# Patient Record
Sex: FEMALE | Race: WHITE | Employment: PART TIME | URBAN - METROPOLITAN AREA
[De-identification: names, ages, dates, MRNs, and addresses within clinical notes are randomized per-mention and may not be internally consistent; named-entity substitution may affect disease eponyms.]

---

## 2022-03-02 ENCOUNTER — APPOINTMENT (OUTPATIENT)
Dept: CT IMAGING | Facility: CLINIC | Age: 22
End: 2022-03-02
Attending: EMERGENCY MEDICINE
Payer: COMMERCIAL

## 2022-03-02 ENCOUNTER — HOSPITAL ENCOUNTER (EMERGENCY)
Facility: CLINIC | Age: 22
Discharge: HOME | End: 2022-03-03
Attending: EMERGENCY MEDICINE | Admitting: EMERGENCY MEDICINE
Payer: COMMERCIAL

## 2022-03-02 DIAGNOSIS — E87.6 HYPOKALEMIA: ICD-10-CM

## 2022-03-02 DIAGNOSIS — R94.31 LONG QT INTERVAL: ICD-10-CM

## 2022-03-02 DIAGNOSIS — R55 SYNCOPE, UNSPECIFIED SYNCOPE TYPE: ICD-10-CM

## 2022-03-02 DIAGNOSIS — S06.0X9A CONCUSSION WITH LOSS OF CONSCIOUSNESS, INITIAL ENCOUNTER: ICD-10-CM

## 2022-03-02 LAB
ANION GAP SERPL CALCULATED.3IONS-SCNC: 4 MMOL/L (ref 3–14)
B-HCG FREE SERPL-ACNC: <5 IU/L (ref 0–5)
BASOPHILS # BLD AUTO: 0 10E3/UL (ref 0–0.2)
BASOPHILS NFR BLD AUTO: 1 %
BUN SERPL-MCNC: 8 MG/DL (ref 7–30)
CALCIUM SERPL-MCNC: 9.6 MG/DL (ref 8.5–10.1)
CHLORIDE BLD-SCNC: 106 MMOL/L (ref 94–109)
CO2 SERPL-SCNC: 28 MMOL/L (ref 20–32)
CREAT SERPL-MCNC: 0.62 MG/DL (ref 0.52–1.04)
EOSINOPHIL # BLD AUTO: 0.1 10E3/UL (ref 0–0.7)
EOSINOPHIL NFR BLD AUTO: 1 %
ERYTHROCYTE [DISTWIDTH] IN BLOOD BY AUTOMATED COUNT: 13 % (ref 10–15)
GFR SERPL CREATININE-BSD FRML MDRD: >90 ML/MIN/1.73M2
GLUCOSE BLD-MCNC: 99 MG/DL (ref 70–99)
HCT VFR BLD AUTO: 40.6 % (ref 35–47)
HGB BLD-MCNC: 13.4 G/DL (ref 11.7–15.7)
IMM GRANULOCYTES # BLD: 0 10E3/UL
IMM GRANULOCYTES NFR BLD: 0 %
LYMPHOCYTES # BLD AUTO: 2 10E3/UL (ref 0.8–5.3)
LYMPHOCYTES NFR BLD AUTO: 29 %
MAGNESIUM SERPL-MCNC: 2.1 MG/DL (ref 1.6–2.3)
MCH RBC QN AUTO: 29 PG (ref 26.5–33)
MCHC RBC AUTO-ENTMCNC: 33 G/DL (ref 31.5–36.5)
MCV RBC AUTO: 88 FL (ref 78–100)
MONOCYTES # BLD AUTO: 0.4 10E3/UL (ref 0–1.3)
MONOCYTES NFR BLD AUTO: 5 %
NEUTROPHILS # BLD AUTO: 4.5 10E3/UL (ref 1.6–8.3)
NEUTROPHILS NFR BLD AUTO: 64 %
NRBC # BLD AUTO: 0 10E3/UL
NRBC BLD AUTO-RTO: 0 /100
PLATELET # BLD AUTO: 219 10E3/UL (ref 150–450)
POTASSIUM BLD-SCNC: 3.3 MMOL/L (ref 3.4–5.3)
RBC # BLD AUTO: 4.62 10E6/UL (ref 3.8–5.2)
SODIUM SERPL-SCNC: 138 MMOL/L (ref 133–144)
WBC # BLD AUTO: 6.9 10E3/UL (ref 4–11)

## 2022-03-02 PROCEDURE — 96365 THER/PROPH/DIAG IV INF INIT: CPT

## 2022-03-02 PROCEDURE — 36415 COLL VENOUS BLD VENIPUNCTURE: CPT | Performed by: EMERGENCY MEDICINE

## 2022-03-02 PROCEDURE — 96375 TX/PRO/DX INJ NEW DRUG ADDON: CPT

## 2022-03-02 PROCEDURE — 99285 EMERGENCY DEPT VISIT HI MDM: CPT | Mod: 25

## 2022-03-02 PROCEDURE — 70450 CT HEAD/BRAIN W/O DYE: CPT

## 2022-03-02 PROCEDURE — 258N000003 HC RX IP 258 OP 636: Performed by: EMERGENCY MEDICINE

## 2022-03-02 PROCEDURE — 96361 HYDRATE IV INFUSION ADD-ON: CPT

## 2022-03-02 PROCEDURE — 85014 HEMATOCRIT: CPT | Performed by: EMERGENCY MEDICINE

## 2022-03-02 PROCEDURE — 83735 ASSAY OF MAGNESIUM: CPT | Performed by: EMERGENCY MEDICINE

## 2022-03-02 PROCEDURE — 80048 BASIC METABOLIC PNL TOTAL CA: CPT | Performed by: EMERGENCY MEDICINE

## 2022-03-02 PROCEDURE — 84702 CHORIONIC GONADOTROPIN TEST: CPT

## 2022-03-02 PROCEDURE — 250N000013 HC RX MED GY IP 250 OP 250 PS 637: Performed by: EMERGENCY MEDICINE

## 2022-03-02 PROCEDURE — 250N000011 HC RX IP 250 OP 636: Performed by: EMERGENCY MEDICINE

## 2022-03-02 PROCEDURE — 93005 ELECTROCARDIOGRAM TRACING: CPT

## 2022-03-02 RX ORDER — DIPHENHYDRAMINE HYDROCHLORIDE 50 MG/ML
25 INJECTION INTRAMUSCULAR; INTRAVENOUS ONCE
Status: COMPLETED | OUTPATIENT
Start: 2022-03-02 | End: 2022-03-02

## 2022-03-02 RX ORDER — BUTALBITAL, ACETAMINOPHEN AND CAFFEINE 50; 325; 40 MG/1; MG/1; MG/1
1 TABLET ORAL EVERY 6 HOURS PRN
Qty: 10 TABLET | Refills: 0 | Status: SHIPPED | OUTPATIENT
Start: 2022-03-02 | End: 2022-03-02

## 2022-03-02 RX ORDER — BUTALBITAL, ACETAMINOPHEN AND CAFFEINE 50; 325; 40 MG/1; MG/1; MG/1
1 TABLET ORAL EVERY 6 HOURS PRN
Qty: 10 TABLET | Refills: 0 | Status: SHIPPED | OUTPATIENT
Start: 2022-03-02

## 2022-03-02 RX ORDER — POTASSIUM CHLORIDE 1500 MG/1
20 TABLET, EXTENDED RELEASE ORAL ONCE
Status: COMPLETED | OUTPATIENT
Start: 2022-03-02 | End: 2022-03-02

## 2022-03-02 RX ORDER — METOCLOPRAMIDE HYDROCHLORIDE 5 MG/ML
10 INJECTION INTRAMUSCULAR; INTRAVENOUS ONCE
Status: COMPLETED | OUTPATIENT
Start: 2022-03-02 | End: 2022-03-02

## 2022-03-02 RX ORDER — MAGNESIUM SULFATE HEPTAHYDRATE 40 MG/ML
2 INJECTION, SOLUTION INTRAVENOUS ONCE
Status: COMPLETED | OUTPATIENT
Start: 2022-03-02 | End: 2022-03-03

## 2022-03-02 RX ADMIN — DIPHENHYDRAMINE HYDROCHLORIDE 25 MG: 50 INJECTION INTRAMUSCULAR; INTRAVENOUS at 21:57

## 2022-03-02 RX ADMIN — MAGNESIUM SULFATE HEPTAHYDRATE 2 G: 2 INJECTION, SOLUTION INTRAVENOUS at 23:06

## 2022-03-02 RX ADMIN — POTASSIUM CHLORIDE 20 MEQ: 1500 TABLET, EXTENDED RELEASE ORAL at 23:07

## 2022-03-02 RX ADMIN — SODIUM CHLORIDE 1000 ML: 9 INJECTION, SOLUTION INTRAVENOUS at 21:57

## 2022-03-02 RX ADMIN — METOCLOPRAMIDE HYDROCHLORIDE 10 MG: 5 INJECTION INTRAMUSCULAR; INTRAVENOUS at 21:57

## 2022-03-02 ASSESSMENT — ENCOUNTER SYMPTOMS
SHORTNESS OF BREATH: 0
WEAKNESS: 0
NUMBNESS: 0
FEVER: 0
HEADACHES: 1
NAUSEA: 0

## 2022-03-02 NOTE — Clinical Note
Liz Israel was seen and treated in our emergency department on 3/2/2022.  She may return to work on 03/04/2022.       If you have any questions or concerns, please don't hesitate to call.      Gutierrez Garcia MD

## 2022-03-03 VITALS
OXYGEN SATURATION: 98 % | RESPIRATION RATE: 20 BRPM | DIASTOLIC BLOOD PRESSURE: 73 MMHG | HEART RATE: 70 BPM | SYSTOLIC BLOOD PRESSURE: 122 MMHG | WEIGHT: 117.5 LBS | TEMPERATURE: 97.9 F

## 2022-03-03 LAB
ATRIAL RATE - MUSE: 106 BPM
ATRIAL RATE - MUSE: 73 BPM
DIASTOLIC BLOOD PRESSURE - MUSE: NORMAL MMHG
DIASTOLIC BLOOD PRESSURE - MUSE: NORMAL MMHG
INTERPRETATION ECG - MUSE: NORMAL
INTERPRETATION ECG - MUSE: NORMAL
P AXIS - MUSE: 77 DEGREES
P AXIS - MUSE: 80 DEGREES
PR INTERVAL - MUSE: 144 MS
PR INTERVAL - MUSE: 152 MS
QRS DURATION - MUSE: 76 MS
QRS DURATION - MUSE: 78 MS
QT - MUSE: 378 MS
QT - MUSE: 402 MS
QTC - MUSE: 442 MS
QTC - MUSE: 502 MS
R AXIS - MUSE: 93 DEGREES
R AXIS - MUSE: 96 DEGREES
SYSTOLIC BLOOD PRESSURE - MUSE: NORMAL MMHG
SYSTOLIC BLOOD PRESSURE - MUSE: NORMAL MMHG
T AXIS - MUSE: 11 DEGREES
T AXIS - MUSE: 29 DEGREES
VENTRICULAR RATE- MUSE: 106 BPM
VENTRICULAR RATE- MUSE: 73 BPM

## 2022-03-03 NOTE — ED TRIAGE NOTES
Pt arrives to the ED due to a frontal headache that has been present since last Saturday. Denies any h/o of headaches. Pt states feeling dizzy/lightheaded and nauseous. Pt states that on last Sat, she had a feeling where she was going to black out.

## 2022-03-03 NOTE — DISCHARGE INSTRUCTIONS
The CT scan of your head is normal with no bleeding or broken bones.  Your evaluation is consistent with concussion.  Do not engage in any contact activities until cleared by your primary care doctor.    In regards to your episode in which you passed out, I believe this most likely represents a process called vasovagal syncope.  On your EKG, there is an interval that is mildly prolonged called the QT interval.  I would like you to follow-up with cardiology regarding this finding.  Please return to the ER immediately for any additional episodes of passing out or syncope.    Discharge Instructions  Concussion    You were seen today for signs of a concussion.  The symptoms will vary, depending on the nature of your injury and your health. You may have: headache, confusion, nausea (feel sick to your stomach), vomiting (throwing up) and problems with memory, concentrating, or sleep. You may feel dizzy, irritable, and tired. Children and teens may need help from their parents, teachers, and coaches to watch for symptoms as they recover.    Generally, every Emergency Department visit should have a follow-up clinic visit with either a primary or a specialty clinic/provider. Please follow-up as instructed by your emergency provider today.     Return to the Emergency Department if:  Your headache gets worse or you start to have a really bad headache even with the recommended treatment plan.   You feel drowsier, have growing confusion, or slurred speech.   You keep repeating yourself.   You have strange behavior or are feeling more irritable.   You have a seizure.   You vomit (throw up) more than once.   You have trouble walking.   You have weakness or numbness.  Your neck pain gets worse.   You have a loss of consciousness.   You have blood for fluid coming from your ears or nose.   You have new symptoms or anything that worries you.     Home Care:  Get lots of rest and get enough sleep at night. Take daytime naps or rest if  you feel tired.   Limit physical activity and  thinking  activities. These can make symptoms worse.   Physical activities include gym, sports, weight training, running, exercise, and heavy lifting.   Thinking activities include homework, class work, job-related work, and screen time (phone, computer, tablet, TV, and video games).   Stick to a healthy diet and drink lots of fluids. Avoid alcohol.  As symptoms improve, you may slowly return to your daily activities. If symptoms get worse or return, reduce your activity.   Know that it is normal to feel sad or frustrated when you do not feel right and are less active.     Going Back to Work:  Your care team will tell you when you are ready to return to work.    Limit the amount of work you do soon after your injury. This may speed healing. Take breaks if your symptoms get worse. You should also reduce your physical activity as well as activities that require a lot of thinking until you see your doctor. You may need shorter work days and a lighter workload.  Avoid heavy lifting, working with machinery, driving and working at heights until your symptoms are gone or you are cleared by a provider.    Going Back to School:  If you are still having symptoms, you may need extra help at school.  Tell your teachers and school nurse about your injury and symptoms. Ask them to watch for problems with learning, memory, and concentrating. Symptoms may get worse when you do schoolwork, and you may become more irritable. You may need shorter school days, a reduced workload, and to postpone testing.  Do not drive or take gym class (physical activity) until cleared by a provider.    Returning to Sports:  Never return to play if you have any symptoms. A full recovery will reduce the chances of getting hurt again. Remember, it is better to miss one or two games than a whole season.  You should rest from all physical activity until you see your provider. Generally, if all symptoms have  completely cleared, your provider can help guide you to slowly return to sports. If symptoms return or worsen, stop the activity and see your provider.  Important: If you are in an organized sport and under age 18, you will need written consent from a healthcare provider before you return to sports. Typically, this will be your primary care or sports medicine provider. Please make an appointment.    If you were given a prescription for medicine here today, be sure to read all of the information (including the package insert) that comes with your prescription.  This will include important information about the medicine, its side effects, and any warnings that you need to know about.  The pharmacist who fills the prescription can provide more information and answer questions you may have about the medicine.  If you have questions or concerns that the pharmacist cannot address, please call or return to the Emergency Department.     Remember that you can always come back to the Emergency Department if you are not able to see your regular provider in the amount of time listed above, if you get any new symptoms, or if there is anything that worries you.  Discharge Instructions  Syncope    Syncope (fainting) is a sudden, short loss of consciousness (passing out spell). People will usually fall to the ground when they faint or slump over if seated.  People may also shake when this happens, and it can sometimes be difficult to tell the difference between syncope and a seizure. At this time, your provider does not find a reason to suspect that your fainting spell is a sign of anything dangerous or life-threatening.  However, sometimes the signs of serious illness do not show up right away.     Generally, every Emergency Department visit should have a follow-up clinic visit with either a primary or a specialty clinic/provider. Please follow-up as instructed by your emergency provider today.    Return to the Emergency  Department if:  You faint again.   You have any significant bleeding.  You have chest pain or a fast or irregular heartbeat.  You feel short of breath.  You cough up any blood.  You have abdominal (belly) pain or unusual back pain.  You have ongoing vomiting (throwing up) or diarrhea (loose stools).  You have a black or tarry bowel movement, or blood in the stool or in your vomit.  You have a fever over 101 F.  You lose feeling or cannot move a part of your body or cannot talk normally.  You are confused, have a headache, cannot see well, or have a seizure.  DO NOT DRIVE. CALL 911 INSTEAD!    What can I do to help myself?  Follow any specific instructions that your provider discussed with you.  If you feel light-headed, make sure to sit down right away, even if you have to sit on the floor.  Follow up with your regular medical provider as discussed for further management. This may include lowering your blood pressure medications, insulin or other diabetic medications, checking your blood sugar more frequently, and drinking more fluids, taking medicines for vomiting or diarrhea or getting up slower.  If you were given a prescription for medicine here today, be sure to read all of the information (including the package insert) that comes with your prescription.  This will include important information about the medicine, its side effects, and any warnings that you need to know about.  The pharmacist who fills the prescription can provide more information and answer questions you may have about the medicine.  If you have questions or concerns that the pharmacist cannot address, please call or return to the Emergency Department.   Remember that you can always come back to the Emergency Department if you are not able to see your regular provider in the amount of time listed above, if you get any new symptoms, or if there is anything that worries you.

## 2022-03-03 NOTE — ED PROVIDER NOTES
History   Chief Complaint:  Headache       The history is provided by a parent and the patient. A  was used (ASL).      Liz Israel is a 21 year old female, otherwise healthy, who presents with headache. Four days ago, the patient had woken up feeling okay but then went to the bathroom and was suddenly dizzy prior to having a syncopal episode. The prodrome of dizziness lasted 30-60 seconds. When she woke up, she saw her mother over her and she was rather confused how she was on the floor. Her mother noted she was rather pale when she came to. She also noted that her head and arm hurt a little bit. Once she was back in to bed, she began to feel a little better. She has never had a syncopal episode before and she attributed it to maybe not eating enough that day. Later that afternoon, she developed a headache in her forehead. Since then, her headache has been ongoing and she has been taking ibuprofen at home which offers a little relief. Additionally, she notes having some hot/cold flashes that come and go. Today, she was seen at Urgent Care but they did did not do much of a workup at that time. She denies any recent fever, shortness of breath, chest pain, nausea, weakness, or numbness.       Review of Systems   Constitutional: Negative for fever.   Respiratory: Negative for shortness of breath.    Cardiovascular: Negative for chest pain.   Gastrointestinal: Negative for nausea.   Neurological: Positive for headaches. Negative for weakness and numbness.     Allergies:  The patient has no known allergies.     Medications:  Birth control pill per patient's mother    Past Medical History:     Deaf    Past Surgical History:    The patient has no known surgical history.    Family History:    The patient has no known family history.    Social History:  The patient presents to the ED with her mother.      Physical Exam     Patient Vitals for the past 24 hrs:   BP Temp Temp src Pulse Resp SpO2  Weight   03/02/22 2052 119/72 97.9  F (36.6  C) Oral 71 20 100 % 53.3 kg (117 lb 8.1 oz)       Physical Exam    HEENT:   No scalp hematoma or defect to the bony calvarium.      Allison's and Racoon's sign negative.      Oropharynx is moist  EYES:  Conjunctiva normal, PERRL    EOMs intact  NECK:   C-spine non-tender.      No bony step-off to cervical spine.   CV:    Regular rate and rhythm.     No murmurs, rubs or gallops.    PULM:  Clear to auscultation bilateral.      No respiratory distress.    ABD:   Soft, non-tender, non-distended.      No rebound or guarding.  MSK:    No gross deformity to the extremities.    LYMPH:  No cervical lymphadenopathy.  NEURO:  Alert and oriented x 3. GCS 15.      CN II-XII intact    Finger to nose normal bilateral     Strength is 5/5 in all 4 extremities.  Sensation is intact.      Normal muscular tone, no tremor.  SKIN:   Warm, dry and intact.    PSYCH:   Mood is good and affect is appropriate.      Emergency Department Course   ECG  ECG obtained at 2206, ECG read at 2210  Sinus tachycardia. Rightward axis. Nonspecific T wave abnormality. Prolonged QT interval.    Rate 106 bpm. WV interval 144 ms. QRS duration 76 ms. QT/QTc 378/502 ms. P-R-T axes 80 96 11.     Imaging:  Head CT w/o contrast    (Results Pending)     Report per radiology    Laboratory:  Labs Ordered and Resulted from Time of ED Arrival to Time of ED Departure   BASIC METABOLIC PANEL - Abnormal       Result Value    Sodium 138      Potassium 3.3 (*)     Chloride 106      Carbon Dioxide (CO2) 28      Anion Gap 4      Urea Nitrogen 8      Creatinine 0.62      Calcium 9.6      Glucose 99      GFR Estimate >90     ISTAT HCG QUANTITATIVE PREGNANCY POCT - Normal    HCG QUANTITATIVE POCT <5.0     CBC WITH PLATELETS AND DIFFERENTIAL    WBC Count 6.9      RBC Count 4.62      Hemoglobin 13.4      Hematocrit 40.6      MCV 88      MCH 29.0      MCHC 33.0      RDW 13.0      Platelet Count 219      % Neutrophils 64      % Lymphocytes  29      % Monocytes 5      % Eosinophils 1      % Basophils 1      % Immature Granulocytes 0      NRBCs per 100 WBC 0      Absolute Neutrophils 4.5      Absolute Lymphocytes 2.0      Absolute Monocytes 0.4      Absolute Eosinophils 0.1      Absolute Basophils 0.0      Absolute Immature Granulocytes 0.0      Absolute NRBCs 0.0     MAGNESIUM        Emergency Department Course:             Reviewed:  I reviewed nursing notes, vitals, past medical history, Care Everywhere and [facility]    Assessments:   I obtained history and examined the patient as noted above.    I rechecked the patient and explained findings.     Interventions:   NS 1L IV Bolus   Benadryl 25 mg IV   Reglan 10 mg IV    Disposition:  The patient was discharged to home.     Impression & Plan     CMS Diagnoses: None    Medical Decision Makin-year-old female seen in the ED with a syncopal episode 4 days prior to arrival resulting in closed head injury and persistent headache.  Investigation into her syncope reveals no EKG evidence of WPW, Brugada or hypertrophic cardiomyopathy.  Her QTc is mildly prolonged at 500.  There is mild hypokalemia thus patient given oral potassium and IV magnesium.  EKG will repeated after magnesium to evaluate for improvement of QTc.  Low suspicion that her syncope is related to prolonged QT interval inducing malignant dysrhythmia and is more suggestive of vasovagal syncope.  Her laboratory studies are reassuring with no acute anemia.  She has no chest pain or shortness of breath to suggest aortic dissection, aortic aneurysm or pulmonary embolism.  I believe patient is safe for discharge home, close follow-up with primary care physician and will be referred to cardiology given the episode of syncope and prolonged QT interval on EKG.    In regards to her headache, she did strike her head during the syncopal episode.  CT scan of her head undertaken and unremarkable.  Evaluation is consistent with  concussion.  Patient was given appropriate anticipatory guidance.  Supportive measures indicated and close follow-up with PCP.    Diagnosis:    ICD-10-CM    1. Syncope, unspecified syncope type  R55 Follow-Up with Cardiology   2. Concussion with loss of consciousness, initial encounter  S06.0X9A    3. Long QT interval  R94.31 Follow-Up with Cardiology   4. Hypokalemia  E87.6        Discharge Medications:  New Prescriptions    BUTALBITAL-ACETAMINOPHEN-CAFFEINE (ESGIC) -40 MG TABLET    Take 1 tablet by mouth every 6 hours as needed for headaches       Scribe Disclosure:  I, Kristyn Briceno, am serving as a scribe at 9:02 PM on 3/2/2022 to document services personally performed by Gutierrez Garcia MD based on my observations and the provider's statements to me.              Gutierrez Garcia MD  03/02/22 [id number]

## 2022-03-11 ENCOUNTER — OFFICE VISIT (OUTPATIENT)
Dept: CARDIOLOGY | Facility: CLINIC | Age: 22
End: 2022-03-11
Attending: EMERGENCY MEDICINE
Payer: COMMERCIAL

## 2022-03-11 VITALS
SYSTOLIC BLOOD PRESSURE: 122 MMHG | DIASTOLIC BLOOD PRESSURE: 72 MMHG | HEIGHT: 65 IN | WEIGHT: 117.5 LBS | BODY MASS INDEX: 19.58 KG/M2 | HEART RATE: 71 BPM | OXYGEN SATURATION: 100 %

## 2022-03-11 DIAGNOSIS — R94.31 LONG QT INTERVAL: ICD-10-CM

## 2022-03-11 DIAGNOSIS — R55 SYNCOPE, UNSPECIFIED SYNCOPE TYPE: ICD-10-CM

## 2022-03-11 PROCEDURE — G0463 HOSPITAL OUTPT CLINIC VISIT: HCPCS

## 2022-03-11 PROCEDURE — 99204 OFFICE O/P NEW MOD 45 MIN: CPT | Performed by: INTERNAL MEDICINE

## 2022-03-11 ASSESSMENT — PATIENT HEALTH QUESTIONNAIRE - PHQ9: SUM OF ALL RESPONSES TO PHQ QUESTIONS 1-9: 10

## 2022-03-11 ASSESSMENT — PAIN SCALES - GENERAL: PAINLEVEL: NO PAIN (0)

## 2022-03-11 NOTE — NURSING NOTE
Chief Complaint   Patient presents with     New Patient     gen cardio consult      Vitals were taken and medications were reconciled.    Loyd Crowell, EMT  2:15 PM

## 2022-03-11 NOTE — LETTER
3/11/2022      RE: Liz Israel  [address]  [place] [facility] [address]       Dear Colleague,    Thank you for the opportunity to participate in the care of your patient, Liz Israel, at the [facility] [place] [facility]. Please see a copy of my visit note below.    Cardiology Clinic Note  March 11, 2022    HPI    Dear colleagues,     I had the pleasure of seeing Ms. Liz Israel in the Cardiology clinic.  I am seeing Liz as ER follow-up visit for syncope.    Liz presents with her mother.  She is deaf and thus utilized in the .  She says that on February 26 she was in her normal state of health and felt well and woke up that morning to help her mother in the kitchen.  Upon getting up and walking around the house she felt suddenly dizzy and lightheaded and fell in the bathroom.  She felt as if she was going to fall down thus she braced herself against the wall and hit her elbow.  Her mother noticed and came to her quickly.  There is no loss of consciousness.  She was able to get up and felt okay afterwards and it thought it was possibly due to relative dehydration or not eating yet.  She continued feeling like this was some lightheadedness and dizziness for the next few days but without any further falls.  They went to the emergency room 4 days later after the event.  She had also noted a headache that has been more problems were last few days and been taking ibuprofen with little relief.  In the emergency department she was found to have normal vitals, relatively normal blood work although a low potassium of 3.3, a normal CT had, and EKGs that otherwise showed normal sinus rhythm although one of them did show a prolonged QT interval of 500 ms.  The prolonged QT was thought to be due to the low potassium and low magnesium and thus she was supplemented and the subsequent EKG showed a  "QT of 440 ms.  She was discharged from the ER with supportive care and close follow-up with her primary care physician.    Patient states she otherwise feels okay returning back to normal.  She corroborates much of the story above.  She says she was in her normal state of health.  She does not have any    PAST MEDICAL HISTORY:  None notable.  No known heart or vascular disease.    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  No illicit substance use    ALLERGIES:  No Known Allergies    CURRENT MEDICATIONS:  Current Outpatient Medications   Medication Sig Dispense Refill     butalbital-acetaminophen-caffeine (ESGIC) -40 MG tablet Take 1 tablet by mouth every 6 hours as needed for headaches 10 tablet 0     hydrOXYzine (ATARAX) 25 MG tablet Take 1 tablet (25 mg) by mouth every 6 hours as needed for anxiety (Patient not taking: Reported on 3/11/2022) 60 tablet 1       ROS:   Constitutional: No fever, chills, or sweats. Weight is 117 lbs 8 oz  ENT: No visual disturbance, ear ache, epistaxis, sore throat.   Allergies/Immunologic: Negative.   Respiratory: No cough, hemoptysis.   Cardiovascular: As per HPI.   GI: No nausea, vomiting, hematemesis, melena, or hematochezia.   : No urinary frequency, dysuria, or hematuria.   Integument: Negative.   Psychiatric: Pleasant, no major depression noted  Neuro: No focal neurological deficits noted  Endocrinology: Negative.   Musculoskeletal: No new joint pains    EXAM:  /72 (BP Location: Right arm, Patient Position: Chair, Cuff Size: Adult Small)   Pulse 71   Ht 1.651 m (5' 5\")   Wt 53.3 kg (117 lb 8 oz)   LMP 02/15/2022 (Approximate)   SpO2 100%   BMI 19.55 kg/m      General: appears comfortable, alert  Head: normocephalic, atraumatic  Eyes: anicteric sclera, EOMI  Heart: regular, S1/S2, no murmur, gallop, rub, estimated JVP 8 cm water  Lungs: clear, no rales or wheezing  Abdomen: soft  Extremities: no " clubbing, cyanosis or edema  Neurological: normal affect, no gross motor deficits    Weight  Wt Readings from Last 10 Encounters:   03/11/22 53.3 kg (117 lb 8 oz)   03/02/22 53.3 kg (117 lb 8.1 oz)       Labs:  CBC RESULTS:  Lab Results   Component Value Date    WBC 6.9 03/02/2022    RBC 4.62 03/02/2022    HGB 13.4 03/02/2022    HCT 40.6 03/02/2022    MCV 88 03/02/2022    MCH 29.0 03/02/2022    MCHC 33.0 03/02/2022    RDW 13.0 03/02/2022     03/02/2022       CMP RESULTS:  Lab Results   Component Value Date     03/02/2022    POTASSIUM 3.3 (L) 03/02/2022    CHLORIDE 106 03/02/2022    CO2 28 03/02/2022    ANIONGAP 4 03/02/2022    GLC 99 03/02/2022    BUN 8 03/02/2022    CR 0.62 03/02/2022    GFRESTIMATED >90 03/02/2022    TANYA 9.6 03/02/2022        BNP RESULTS:  No results found for: NTBNPI    Testing/Procedures:  I personally visualized and interpreted:  EKG on 3/2/2022: Sinus rhythm, 1 with a QT of 500 ms, second with a QT interval 440 ms after electrolyte supplementation    Assessment and Plan:     In summary, this is a very pleasant 21-year-old female who I am seeing as an ER follow-up for syncope.    She is considered low risk due to not having any prior cardiac history, no cardiac exam findings, no loss of consciousness.  I did discuss that an echocardiogram or Zio patch could be done but it is likely to be normal.  I discussed with her and her mother that her syncope is likely neurologically mediated or vasovagal nature and unlikely to be cardiac.  Sure she did have an initial QT that was prolonged but responded appropriately to supplementation.  Certainly if this were to happen again that we can do the above studies.  She and her mother agreed and will follow up with her primary care physician.    The patient states understanding and is agreeable with plan.   Feel free to contact myself regarding questions or concerns.  It was a pleasure to see this patient today.          Today's clinic visit  entailed:  45 minutes spent on the date of the encounter doing chart review, history and exam, documentation and further activities per the note  Provider  Link to MDM Help Grid     The level of medical decision making during this visit was of moderate complexity.            Please do not hesitate to contact me if you have any questions/concerns.     Sincerely,     Haroon Smith MD

## 2022-03-11 NOTE — PROGRESS NOTES
Cardiology Clinic Note  March 11, 2022    HPI    Dear colleagues,     I had the pleasure of seeing Ms. Liz Israel in the Cardiology clinic.  I am seeing Liz as ER follow-up visit for syncope.    Liz presents with her mother.  She is deaf and thus utilized in the .  She says that on February 26 she was in her normal state of health and felt well and woke up that morning to help her mother in the kitchen.  Upon getting up and walking around the house she felt suddenly dizzy and lightheaded and fell in the bathroom.  She felt as if she was going to fall down thus she braced herself against the wall and hit her elbow.  Her mother noticed and came to her quickly.  There is no loss of consciousness.  She was able to get up and felt okay afterwards and it thought it was possibly due to relative dehydration or not eating yet.  She continued feeling like this was some lightheadedness and dizziness for the next few days but without any further falls.  They went to the emergency room 4 days later after the event.  She had also noted a headache that has been more problems were last few days and been taking ibuprofen with little relief.  In the emergency department she was found to have normal vitals, relatively normal blood work although a low potassium of 3.3, a normal CT had, and EKGs that otherwise showed normal sinus rhythm although one of them did show a prolonged QT interval of 500 ms.  The prolonged QT was thought to be due to the low potassium and low magnesium and thus she was supplemented and the subsequent EKG showed a QT of 440 ms.  She was discharged from the ER with supportive care and close follow-up with her primary care physician.    Patient states she otherwise feels okay returning back to normal.  She corroborates much of the story above.  She says she was in her normal state of health.  She does not have any    PAST MEDICAL HISTORY:  None notable.  No  "known heart or vascular disease.    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  No illicit substance use    ALLERGIES:  No Known Allergies    CURRENT MEDICATIONS:  Current Outpatient Medications   Medication Sig Dispense Refill     butalbital-acetaminophen-caffeine (ESGIC) -40 MG tablet Take 1 tablet by mouth every 6 hours as needed for headaches 10 tablet 0     hydrOXYzine (ATARAX) 25 MG tablet Take 1 tablet (25 mg) by mouth every 6 hours as needed for anxiety (Patient not taking: Reported on 3/11/2022) 60 tablet 1       ROS:   Constitutional: No fever, chills, or sweats. Weight is 117 lbs 8 oz  ENT: No visual disturbance, ear ache, epistaxis, sore throat.   Allergies/Immunologic: Negative.   Respiratory: No cough, hemoptysis.   Cardiovascular: As per HPI.   GI: No nausea, vomiting, hematemesis, melena, or hematochezia.   : No urinary frequency, dysuria, or hematuria.   Integument: Negative.   Psychiatric: Pleasant, no major depression noted  Neuro: No focal neurological deficits noted  Endocrinology: Negative.   Musculoskeletal: No new joint pains    EXAM:  /72 (BP Location: Right arm, Patient Position: Chair, Cuff Size: Adult Small)   Pulse 71   Ht 1.651 m (5' 5\")   Wt 53.3 kg (117 lb 8 oz)   LMP 02/15/2022 (Approximate)   SpO2 100%   BMI 19.55 kg/m      General: appears comfortable, alert  Head: normocephalic, atraumatic  Eyes: anicteric sclera, EOMI  Heart: regular, S1/S2, no murmur, gallop, rub, estimated JVP 8 cm water  Lungs: clear, no rales or wheezing  Abdomen: soft  Extremities: no clubbing, cyanosis or edema  Neurological: normal affect, no gross motor deficits    Weight  Wt Readings from Last 10 Encounters:   03/11/22 53.3 kg (117 lb 8 oz)   03/02/22 53.3 kg (117 lb 8.1 oz)       Labs:  CBC RESULTS:  Lab Results   Component Value Date    WBC 6.9 03/02/2022    RBC 4.62 03/02/2022    HGB 13.4 03/02/2022    HCT 40.6 03/02/2022 "    MCV 88 03/02/2022    MCH 29.0 03/02/2022    MCHC 33.0 03/02/2022    RDW 13.0 03/02/2022     03/02/2022       CMP RESULTS:  Lab Results   Component Value Date     03/02/2022    POTASSIUM 3.3 (L) 03/02/2022    CHLORIDE 106 03/02/2022    CO2 28 03/02/2022    ANIONGAP 4 03/02/2022    GLC 99 03/02/2022    BUN 8 03/02/2022    CR 0.62 03/02/2022    GFRESTIMATED >90 03/02/2022    TANYA 9.6 03/02/2022        BNP RESULTS:  No results found for: NTBNPI    Testing/Procedures:  I personally visualized and interpreted:  EKG on 3/2/2022: Sinus rhythm, 1 with a QT of 500 ms, second with a QT interval 440 ms after electrolyte supplementation    Assessment and Plan:     In summary, this is a very pleasant 21-year-old female who I am seeing as an ER follow-up for syncope.    She is considered low risk due to not having any prior cardiac history, no cardiac exam findings, no loss of consciousness.  I did discuss that an echocardiogram or Zio patch could be done but it is likely to be normal.  I discussed with her and her mother that her syncope is likely neurologically mediated or vasovagal nature and unlikely to be cardiac.  Sure she did have an initial QT that was prolonged but responded appropriately to supplementation.  Certainly if this were to happen again that we can do the above studies.  She and her mother agreed and will follow up with her primary care physician.    The patient states understanding and is agreeable with plan.   Feel free to contact myself regarding questions or concerns.  It was a pleasure to see this patient today.    Haroon Smith MD   of Medicine  Advanced Heart Failure and Transplant Cardiology    CC  ABUNDIO BEE    Today's clinic visit entailed:  45 minutes spent on the date of the encounter doing chart review, history and exam, documentation and further activities per the note  Provider  Link to [facility] Help Grid     The level of medical decision making during  this visit was of moderate complexity.